# Patient Record
Sex: MALE | Race: WHITE | NOT HISPANIC OR LATINO | URBAN - NONMETROPOLITAN AREA
[De-identification: names, ages, dates, MRNs, and addresses within clinical notes are randomized per-mention and may not be internally consistent; named-entity substitution may affect disease eponyms.]

---

## 2019-01-07 ENCOUNTER — OFFICE VISIT (OUTPATIENT)
Dept: FAMILY MEDICINE | Facility: OTHER | Age: 64
End: 2019-01-07
Attending: FAMILY MEDICINE
Payer: COMMERCIAL

## 2019-01-07 VITALS
BODY MASS INDEX: 25.16 KG/M2 | RESPIRATION RATE: 18 BRPM | TEMPERATURE: 97.9 F | HEIGHT: 68 IN | WEIGHT: 166 LBS | SYSTOLIC BLOOD PRESSURE: 124 MMHG | HEART RATE: 68 BPM | DIASTOLIC BLOOD PRESSURE: 70 MMHG

## 2019-01-07 DIAGNOSIS — C44.90 SKIN CANCER: Primary | ICD-10-CM

## 2019-01-07 PROCEDURE — 99213 OFFICE O/P EST LOW 20 MIN: CPT | Performed by: FAMILY MEDICINE

## 2019-01-07 ASSESSMENT — PAIN SCALES - GENERAL: PAINLEVEL: NO PAIN (0)

## 2019-01-07 ASSESSMENT — MIFFLIN-ST. JEOR: SCORE: 1524.53

## 2019-01-07 NOTE — NURSING NOTE
"Patient presents in the clinic with concerns of a growth on the right side of his nose that first began 7 years ago and has recently increased in size, patient denies any pain.  Martine Grewal LPN 1/7/2019 7:49 AM  Chief Complaint   Patient presents with     Mass     Nose       Initial /70 (BP Location: Right arm, Patient Position: Sitting, Cuff Size: Adult Regular)   Pulse 68   Temp 97.9  F (36.6  C) (Tympanic)   Resp 18   Ht 1.715 m (5' 7.5\")   Wt 75.3 kg (166 lb)   BMI 25.62 kg/m   Estimated body mass index is 25.62 kg/m  as calculated from the following:    Height as of this encounter: 1.715 m (5' 7.5\").    Weight as of this encounter: 75.3 kg (166 lb).  Medication Reconciliation: complete    Amara Grewal LPN    "

## 2019-01-08 NOTE — PROGRESS NOTES
"  SUBJECTIVE:   Cristopher Sousa is a 63 year old male who presents to clinic today for the following health issues: Right nasal growth    Patient arrives here for growth to his right nose.  Is been there for couple years.  States he touches it and will bleed very easily.          Patient Active Problem List    Diagnosis Date Noted     Skin cancer 01/07/2019     Priority: Medium     History reviewed. No pertinent past medical history.   History reviewed. No pertinent surgical history.  Social History     Social History Narrative     Not on file     No Known Allergies    Review of Systems     OBJECTIVE:     /70 (BP Location: Right arm, Patient Position: Sitting, Cuff Size: Adult Regular)   Pulse 68   Temp 97.9  F (36.6  C) (Tympanic)   Resp 18   Ht 1.715 m (5' 7.5\")   Wt 75.3 kg (166 lb)   BMI 25.62 kg/m    Body mass index is 25.62 kg/m .  Physical Exam   Constitutional: He appears well-developed.   Skin:   Approximately 1 cm hard lesion friable to the right side of his nose.  Consistent with a cancer        none     ASSESSMENT/PLAN:         1. Skin cancer  Referral ENT  - OTOLARYNGOLOGY REFERRAL      Jose Figueredo MD  [facility]  "

## 2019-01-29 ENCOUNTER — OFFICE VISIT (OUTPATIENT)
Dept: OTOLARYNGOLOGY | Facility: OTHER | Age: 64
End: 2019-01-29
Attending: OTOLARYNGOLOGY
Payer: COMMERCIAL

## 2019-01-29 DIAGNOSIS — C44.310 BCC (BASAL CELL CARCINOMA), FACE: Primary | ICD-10-CM

## 2019-01-29 PROCEDURE — G0463 HOSPITAL OUTPT CLINIC VISIT: HCPCS

## 2019-02-01 NOTE — PROGRESS NOTES
IVETTE SOTO    63 Y old Male, : 1955    Account Number: [id number]    [address] [place] [address], [address], [id number]    Home: [phone]     Guarantor: IVETTE SOTO Insurance: [facility] Payer ID:    PCP: Jose Figueredo MD    Appointment Facility: [facility]      2019 Progress Notes: Jeremy Ernandez MD       Reason for Appointment   1. CANCER RIGHT NASAL GROWTH   2. Skin lesion right nasal facial groove   History of Present Illness   HPI:   The patient is a 63-year-old gentleman who's watched obvious cancer gradually enlarge in his right nasal facial groove for the last 6 or 7 years. It does occasionally bleed. He has not had a biopsy to date.   Examination   General Examination:  Integument-he has an irregular raised lesion with telangiectasia of his right nasal facial groove that measures 2-2.5 cm in greatest dimension. It has the gross appearance of a basal cell cancer. Has multiple areas of sun damage on his face. There is a crusting lesion of his right forehead as well that is quite small.   Oral cavity oropharynx, nasal, neck-Clear   General-the patient appears well and in no distress   Neuro-there are no focal cranial nerve deficits.     Assessments   1. 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 Others   Notes: This would require excision with frozen section control of margins I suspect it would be best closed by a local bilobed flap. Depending the size of the defect, may even require a forehead flap. This was all reviewed patient in great detail. His questions were answered. He does wish to proceed with excision and repair. We will make arrangements for this at his convenience. I have suggested that a formal dermatology assessment would be reasonable after he gets healed up for a more general skin survey.  Procedures  [ ].                      Electronically signed by JEREMY ERNANDEZ MD on 2019 at 01:37 PM CST   Sign off  status: Completed          [facility]  [address] [place] [address]  Tel: [phone]  Fax:           Patient: IVETTE SOTO : 1955 Progress Note: Jeremy Ernandez MD 2019      Note generated by [employer] EMR/PM Software ([email or web address])

## 2019-02-03 ENCOUNTER — MEDICAL CORRESPONDENCE (OUTPATIENT)
Dept: HEALTH INFORMATION MANAGEMENT | Facility: OTHER | Age: 64
End: 2019-02-03

## 2019-02-04 ENCOUNTER — OFFICE VISIT (OUTPATIENT)
Dept: FAMILY MEDICINE | Facility: OTHER | Age: 64
End: 2019-02-04
Attending: FAMILY MEDICINE
Payer: COMMERCIAL

## 2019-02-04 VITALS
DIASTOLIC BLOOD PRESSURE: 74 MMHG | HEIGHT: 68 IN | BODY MASS INDEX: 26.04 KG/M2 | HEART RATE: 65 BPM | SYSTOLIC BLOOD PRESSURE: 126 MMHG | WEIGHT: 171.8 LBS | RESPIRATION RATE: 16 BRPM | OXYGEN SATURATION: 99 %

## 2019-02-04 DIAGNOSIS — E78.00 HYPERCHOLESTEROLEMIA: ICD-10-CM

## 2019-02-04 DIAGNOSIS — C44.90 SKIN CANCER: ICD-10-CM

## 2019-02-04 DIAGNOSIS — Z01.818 PREOPERATIVE EXAMINATION: Primary | ICD-10-CM

## 2019-02-04 LAB
ANION GAP SERPL CALCULATED.3IONS-SCNC: 5 MMOL/L (ref 3–14)
BUN SERPL-MCNC: 18 MG/DL (ref 7–25)
CALCIUM SERPL-MCNC: 9.6 MG/DL (ref 8.6–10.3)
CHLORIDE SERPL-SCNC: 103 MMOL/L (ref 98–107)
CHOLEST SERPL-MCNC: 322 MG/DL
CO2 SERPL-SCNC: 30 MMOL/L (ref 21–31)
CREAT SERPL-MCNC: 1.07 MG/DL (ref 0.7–1.3)
ERYTHROCYTE [DISTWIDTH] IN BLOOD BY AUTOMATED COUNT: 11.8 % (ref 10–15)
GFR SERPL CREATININE-BSD FRML MDRD: 70 ML/MIN/{1.73_M2}
GLUCOSE SERPL-MCNC: 107 MG/DL (ref 70–105)
HCT VFR BLD AUTO: 44 % (ref 40–53)
HDLC SERPL-MCNC: 60 MG/DL (ref 23–92)
HGB BLD-MCNC: 14.8 G/DL (ref 13.3–17.7)
LDLC SERPL CALC-MCNC: 225 MG/DL
MCH RBC QN AUTO: 29.6 PG (ref 26.5–33)
MCHC RBC AUTO-ENTMCNC: 33.6 G/DL (ref 31.5–36.5)
MCV RBC AUTO: 88 FL (ref 78–100)
NONHDLC SERPL-MCNC: 262 MG/DL
PLATELET # BLD AUTO: 258 10E9/L (ref 150–450)
POTASSIUM SERPL-SCNC: 4.2 MMOL/L (ref 3.5–5.1)
RBC # BLD AUTO: 5 10E12/L (ref 4.4–5.9)
SODIUM SERPL-SCNC: 138 MMOL/L (ref 134–144)
TRIGL SERPL-MCNC: 186 MG/DL
WBC # BLD AUTO: 4.5 10E9/L (ref 4–11)

## 2019-02-04 PROCEDURE — 99214 OFFICE O/P EST MOD 30 MIN: CPT | Mod: 25 | Performed by: FAMILY MEDICINE

## 2019-02-04 PROCEDURE — 80048 BASIC METABOLIC PNL TOTAL CA: CPT | Performed by: FAMILY MEDICINE

## 2019-02-04 PROCEDURE — 90715 TDAP VACCINE 7 YRS/> IM: CPT | Performed by: FAMILY MEDICINE

## 2019-02-04 PROCEDURE — 85027 COMPLETE CBC AUTOMATED: CPT | Performed by: FAMILY MEDICINE

## 2019-02-04 PROCEDURE — 90471 IMMUNIZATION ADMIN: CPT | Performed by: FAMILY MEDICINE

## 2019-02-04 PROCEDURE — 93000 ELECTROCARDIOGRAM COMPLETE: CPT | Performed by: INTERNAL MEDICINE

## 2019-02-04 PROCEDURE — 36415 COLL VENOUS BLD VENIPUNCTURE: CPT | Performed by: FAMILY MEDICINE

## 2019-02-04 PROCEDURE — 96372 THER/PROPH/DIAG INJ SC/IM: CPT

## 2019-02-04 PROCEDURE — 80061 LIPID PANEL: CPT | Performed by: FAMILY MEDICINE

## 2019-02-04 RX ORDER — ATORVASTATIN CALCIUM 20 MG/1
20 TABLET, FILM COATED ORAL DAILY
Qty: 90 TABLET | Refills: 3 | Status: SHIPPED | OUTPATIENT
Start: 2019-02-04 | End: 2020-02-04

## 2019-02-04 ASSESSMENT — MIFFLIN-ST. JEOR: SCORE: 1550.84

## 2019-02-04 ASSESSMENT — PAIN SCALES - GENERAL: PAINLEVEL: NO PAIN (0)

## 2019-02-04 ASSESSMENT — ANXIETY QUESTIONNAIRES
1. FEELING NERVOUS, ANXIOUS, OR ON EDGE: NOT AT ALL
3. WORRYING TOO MUCH ABOUT DIFFERENT THINGS: NOT AT ALL
GAD7 TOTAL SCORE: 0
5. BEING SO RESTLESS THAT IT IS HARD TO SIT STILL: NOT AT ALL
6. BECOMING EASILY ANNOYED OR IRRITABLE: NOT AT ALL
7. FEELING AFRAID AS IF SOMETHING AWFUL MIGHT HAPPEN: NOT AT ALL
2. NOT BEING ABLE TO STOP OR CONTROL WORRYING: NOT AT ALL

## 2019-02-04 ASSESSMENT — PATIENT HEALTH QUESTIONNAIRE - PHQ9
SUM OF ALL RESPONSES TO PHQ QUESTIONS 1-9: 0
5. POOR APPETITE OR OVEREATING: NOT AT ALL

## 2019-02-04 NOTE — LETTER
February 4, 2019      Cristopher Sousa  [address] [facility] [address]        Dear ,    We are writing to inform you of your test results.    As you can see your cholesterol is quite elevated.  I did send in a prescription for Lipitor to bring it down.  The elevated cholesterol does put you at a significant risk for coronary vascular disease.    Resulted Orders   Lipid Panel   Result Value Ref Range    Cholesterol 322 (H) <200 mg/dL    Triglycerides 186 (H) <150 mg/dL      Comment:      Borderline high:  150-199 mg/dl  High:             200-499 mg/dl  Very high:       >499 mg/dl      HDL Cholesterol 60 23 - 92 mg/dL    LDL Cholesterol Calculated 225 (H) <100 mg/dL      Comment:      Above desirable:  100-129 mg/dl  Borderline High:  130-159 mg/dL  High:             160-189 mg/dL  Very high:       >189 mg/dl      Non HDL Cholesterol 262 (H) <130 mg/dL      Comment:      Above Desirable:  130-159 mg/dl  Borderline high:  160-189 mg/dl  High:             190-219 mg/dl  Very high:       >219 mg/dl     Basic Metabolic Panel   Result Value Ref Range    Sodium 138 134 - 144 mmol/L    Potassium 4.2 3.5 - 5.1 mmol/L    Chloride 103 98 - 107 mmol/L    Carbon Dioxide 30 21 - 31 mmol/L    Anion Gap 5 3 - 14 mmol/L    Glucose 107 (H) 70 - 105 mg/dL    Urea Nitrogen 18 7 - 25 mg/dL    Creatinine 1.07 0.70 - 1.30 mg/dL    GFR Estimate 70 >60 mL/min/[1.73_m2]    GFR Estimate If Black 85 >60 mL/min/[1.73_m2]    Calcium 9.6 8.6 - 10.3 mg/dL   CBC W PLT No Diff   Result Value Ref Range    WBC 4.5 4.0 - 11.0 10e9/L    RBC Count 5.00 4.4 - 5.9 10e12/L    Hemoglobin 14.8 13.3 - 17.7 g/dL    Hematocrit 44.0 40.0 - 53.0 %    MCV 88 78 - 100 fl    MCH 29.6 26.5 - 33.0 pg    MCHC 33.6 31.5 - 36.5 g/dL    RDW 11.8 10.0 - 15.0 %    Platelet Count 258 150 - 450 10e9/L       If you have any questions or concerns, please call the clinic at the number listed above.       Sincerely,        Jose Figueredo MD

## 2019-02-04 NOTE — NURSING NOTE
"Date of Surgery: 02/28/19   Type of Surgery: growth on face   Surgeon:    [facility]   Fax:     Fever/Chills or other infectious symptoms in past month: no  >10lb weight loss in past two months: no  O2 SAT: 99    Health Care Directive/Code status:  no  Hx of blood transfusions:   no  Td up to date:  no  History of VRE/MRSA:  no Date:     Preoperative Evaluation: Obstructive Sleep Apnea screening    S: Snore -  Do you snore loudly? (louder than talking or loud enough to be heard through closed doors)no  T: Tired - Do you often feel tired, fatigued, or sleepy during the daytime?no  O: Observed - Has anyone ever observed you stop breathing during your sleep?no  P: Pressure - Do you have or are you being treated for high blood pressure?no  B: BMI - BMI greater than 35kg/m2?no  A: Age - Age over 50 years old?yes  N: Neck - Neck circumference greater than 40 cm?no  G: Gender - Gender: Male?yes    Total number of \"YES\" responses:  2    Scoring: Low risk of CHALINO 0-2  At Risk of CHALINO: >3 High Risk of CHALINO: 5-8    Alicia Monzon 2/4/2019 9:02 AM  Medication Reconciliation: complete.    Alicia Monzon LPN  2/4/2019 9:02 AM  "

## 2019-02-04 NOTE — PROGRESS NOTES
"----------------- PREOPERATIVE EXAM ------------------  2/4/2019    SUBJECTIVE:  Cristopher Sousa is a 63 year old male here for preoperative optimization.    I was asked to see Cristopher Sousa by Dr. Ernandez  for preoperative evaluation    Date of Surgery: 02/28  Type of Surgery: remove skin cancer  [facility]:  [place]    HPI: Patient arrives here for preop.  He will be undergoing surgery to his right face for likely basal cell carcinoma.  Is been there for a number of years and slowly worsening.  Patient states this started out as a flaking area.    The ASCVD Risk score (S[place]ana [place] CASTILLO Jr., et al., 2013) failed to calculate for the following reasons:    The valid total cholesterol range is 130 to 320 mg/dL    Nursing Notes:   Alicia Monzon LPN  2/4/2019  9:05 AM  Signed  Date of Surgery: 02/28/19   Type of Surgery: growth on face   Surgeon:    Hospital:  [facility]   Fax:     Fever/Chills or other infectious symptoms in past month: no  >10lb weight loss in past two months: no  O2 SAT: 99    Health Care Directive/Code status:  no  Hx of blood transfusions:   no  Td up to date:  no  History of VRE/MRSA:  no Date:     Preoperative Evaluation: Obstructive Sleep Apnea screening    S: Snore -  Do you snore loudly? (louder than talking or loud enough to be heard through closed doors)no  T: Tired - Do you often feel tired, fatigued, or sleepy during the daytime?no  O: Observed - Has anyone ever observed you stop breathing during your sleep?no  P: Pressure - Do you have or are you being treated for high blood pressure?no  B: BMI - BMI greater than 35kg/m2?no  A: Age - Age over 50 years old?yes  N: Neck - Neck circumference greater than 40 cm?no  G: Gender - Gender: Male?yes    Total number of \"YES\" responses:  2    Scoring: Low risk of CHALINO 0-2  At Risk of CHALINO: >3 High Risk of CHALINO: 5-8    Alicia Monzon 2/4/2019 9:02 AM  Medication Reconciliation: complete.    Alicia Monzon LPN  2/4/2019 9:02 AM " "notes    Patient Active Problem List    Diagnosis Date Noted     Skin cancer 01/07/2019     Priority: Medium       No past medical history on file.    No past surgical history on file.    No family history on file.    Social History     Tobacco Use     Smoking status: Never Smoker     Smokeless tobacco: Never Used   Substance Use Topics     Alcohol use: Yes     Comment: 6 a week      Drug use: No       No current outpatient medications on file.       Allergies:  No Known Allergies    ROS:    Surgical:  patient denies previous complications from prior surgeries including but not limited to prolonged bleeding, anesthesia complications, dysrhythmias, surgical wound infections, or prolonged hospital stay.    Denies family hx of bleeding tendencies, anesthesia complications, or other problems with surgery.     for complete review of systems please see copied notes     -------------------------------------------------------------    PHYSICAL EXAM:  /74 (BP Location: Right arm, Patient Position: Sitting)   Pulse 65   Resp 16   Ht 1.715 m (5' 7.5\")   Wt 77.9 kg (171 lb 12.8 oz)   SpO2 99%   BMI 26.51 kg/m      EXAM:  General Appearance: Pleasant, alert, appropriate appearance for age. No acute distress  Head Exam: Normal. Normocephalic, atraumatic.  Eyes: PERRL, EOMI  Ears: Normal TM's bilaterally. Normal auditory canals and external ears.   OroPharynx: Normal buccal mucosa. Normal pharynx.  Neck: Supple, no masses or nodes, no lymphadenopathy.  No thyromegaly.  Lungs: Normal chest wall and respirations. Clear to auscultation, no wheezes or crackles.  Cardiovascular: Regular rate and rhythm. S1, S2, no murmurs.  Gastrointestinal: Soft, nontender, no abnormal masses or organomegaly. BS normal   Musculoskeletal: No edema.  Skin: There is a nodule a little less than 1 cm on the right nasal fold.  Pearly consistent with a basal  Neurologic Exam:  No tremor.  Psychiatric Exam: Alert and oriented, appropriate " affect.      EKG:  normal EKG, normal sinus rhythm  ---------------------------------------------------------------  LABS  Results for orders placed or performed in visit on 02/04/19   Lipid Panel   Result Value Ref Range    Cholesterol 322 (H) <200 mg/dL    Triglycerides 186 (H) <150 mg/dL    HDL Cholesterol 60 23 - 92 mg/dL    LDL Cholesterol Calculated 225 (H) <100 mg/dL    Non HDL Cholesterol 262 (H) <130 mg/dL   Basic Metabolic Panel   Result Value Ref Range    Sodium 138 134 - 144 mmol/L    Potassium 4.2 3.5 - 5.1 mmol/L    Chloride 103 98 - 107 mmol/L    Carbon Dioxide 30 21 - 31 mmol/L    Anion Gap 5 3 - 14 mmol/L    Glucose 107 (H) 70 - 105 mg/dL    Urea Nitrogen 18 7 - 25 mg/dL    Creatinine 1.07 0.70 - 1.30 mg/dL    GFR Estimate 70 >60 mL/min/[1.73_m2]    GFR Estimate If Black 85 >60 mL/min/[1.73_m2]    Calcium 9.6 8.6 - 10.3 mg/dL   CBC W PLT No Diff   Result Value Ref Range    WBC 4.5 4.0 - 11.0 10e9/L    RBC Count 5.00 4.4 - 5.9 10e12/L    Hemoglobin 14.8 13.3 - 17.7 g/dL    Hematocrit 44.0 40.0 - 53.0 %    MCV 88 78 - 100 fl    MCH 29.6 26.5 - 33.0 pg    MCHC 33.6 31.5 - 36.5 g/dL    RDW 11.8 10.0 - 15.0 %    Platelet Count 258 150 - 450 10e9/L         ASSESSEMENT AND PLAN:    (Z01.818) Preoperative examination  (primary encounter diagnosis)  Patient is medically cleared to proceed with surgery    (C44.90) Skin cancer      PRE OP RECOMMENDATIONS:  Patient is on chronic pain medications no   Patient is on antiplatlet/anticoagulation medication no  Other medications that need adjustment perioperatively none     Other:  Patient was advised to call our office and the surgical services with any change in condition or new symptoms if they were to develop between today and their surgical date.  Especially any cardiopulmonary symptoms or symptoms concerning for an infection.    Jose Figueredo 2/4/2019

## 2019-02-05 ASSESSMENT — ANXIETY QUESTIONNAIRES: GAD7 TOTAL SCORE: 0

## 2019-03-05 ENCOUNTER — TRANSFERRED RECORDS (OUTPATIENT)
Dept: HEALTH INFORMATION MANAGEMENT | Facility: OTHER | Age: 64
End: 2019-03-05

## 2019-03-11 ENCOUNTER — TRANSFERRED RECORDS (OUTPATIENT)
Dept: HEALTH INFORMATION MANAGEMENT | Facility: OTHER | Age: 64
End: 2019-03-11